# Patient Record
Sex: MALE | Race: WHITE
[De-identification: names, ages, dates, MRNs, and addresses within clinical notes are randomized per-mention and may not be internally consistent; named-entity substitution may affect disease eponyms.]

---

## 2018-04-13 ENCOUNTER — RECORDS - HEALTHEAST (OUTPATIENT)
Dept: ADMINISTRATIVE | Facility: OTHER | Age: 23
End: 2018-04-13

## 2018-05-29 ENCOUNTER — COMMUNICATION - HEALTHEAST (OUTPATIENT)
Dept: TELEHEALTH | Facility: CLINIC | Age: 23
End: 2018-05-29

## 2018-05-29 ENCOUNTER — OFFICE VISIT - HEALTHEAST (OUTPATIENT)
Dept: FAMILY MEDICINE | Facility: CLINIC | Age: 23
End: 2018-05-29

## 2018-05-29 DIAGNOSIS — F90.0 ATTENTION DEFICIT HYPERACTIVITY DISORDER (ADHD), PREDOMINANTLY INATTENTIVE TYPE: ICD-10-CM

## 2018-05-29 DIAGNOSIS — Z76.89 ENCOUNTER TO ESTABLISH CARE: ICD-10-CM

## 2018-05-29 LAB
AMPHETAMINES UR QL SCN: ABNORMAL
BARBITURATES UR QL: ABNORMAL
BENZODIAZ UR QL: ABNORMAL
CANNABINOIDS UR QL SCN: ABNORMAL
COCAINE UR QL: ABNORMAL
CREAT UR-MCNC: 168.9 MG/DL
METHADONE UR QL SCN: ABNORMAL
OPIATES UR QL SCN: ABNORMAL
OXYCODONE UR QL: ABNORMAL
PCP UR QL SCN: ABNORMAL

## 2018-05-29 ASSESSMENT — MIFFLIN-ST. JEOR: SCORE: 1823.46

## 2018-05-30 ENCOUNTER — COMMUNICATION - HEALTHEAST (OUTPATIENT)
Dept: FAMILY MEDICINE | Facility: CLINIC | Age: 23
End: 2018-05-30

## 2018-06-01 ENCOUNTER — COMMUNICATION - HEALTHEAST (OUTPATIENT)
Dept: FAMILY MEDICINE | Facility: CLINIC | Age: 23
End: 2018-06-01

## 2018-06-01 DIAGNOSIS — F90.0 ATTENTION DEFICIT HYPERACTIVITY DISORDER (ADHD), PREDOMINANTLY INATTENTIVE TYPE: ICD-10-CM

## 2018-06-04 RX ORDER — DEXTROAMPHETAMINE SACCHARATE, AMPHETAMINE ASPARTATE MONOHYDRATE, DEXTROAMPHETAMINE SULFATE AND AMPHETAMINE SULFATE 1.25; 1.25; 1.25; 1.25 MG/1; MG/1; MG/1; MG/1
5 CAPSULE, EXTENDED RELEASE ORAL EVERY MORNING
Qty: 30 CAPSULE | Refills: 0 | Status: SHIPPED | OUTPATIENT
Start: 2018-06-04

## 2021-04-21 ENCOUNTER — AMBULATORY - HEALTHEAST (OUTPATIENT)
Dept: NURSING | Facility: CLINIC | Age: 26
End: 2021-04-21

## 2021-05-12 ENCOUNTER — AMBULATORY - HEALTHEAST (OUTPATIENT)
Dept: NURSING | Facility: CLINIC | Age: 26
End: 2021-05-12

## 2021-06-01 VITALS — BODY MASS INDEX: 25.38 KG/M2 | WEIGHT: 181.3 LBS | HEIGHT: 71 IN

## 2021-06-18 NOTE — PROGRESS NOTES
Office Visit - New Patient   Arik Sparks   22 y.o.  male    Date of visit: 5/29/2018  Physician: Domonique Su CNP     Assessment and Plan   1. Encounter to establish care  2. Attention deficit hyperactivity disorder (ADHD), predominantly inattentive type  I reviewed summary of his evaluation and recommendation.  Discussed with patient possible available treatment for ADHD.  Also discussed the side effects of medication.  Did inform patient about possible addiction from the use of medication. Patient verbalized understanding of these issues, agrees with the plan and all questions were answered today. Patient was given an opportuntity to voice any other symptoms or concerns not listed above. Patient did not have any other symptoms or concerns.  - dextroamphetamine-amphetamine (ADDERALL XR) 5 MG 24 hr capsule; Take 1 capsule (5 mg total) by mouth every morning.  Dispense: 30 capsule; Refill: 0  - Drug Abuse 1+, Urine    Return in about 1 month (around 6/29/2018).     Chief Complaint   Chief Complaint   Patient presents with     Establish Care     New patient     ADHD        Patient Profile   Social History     Social History Narrative        Past Medical History   Patient Active Problem List   Diagnosis     ADD (attention deficit disorder)       Past Surgical History  He has a past surgical history that includes Tonsillectomy.     History of Present Illness   This 22 y.o. old male patient presents to the clinic to establish care and to start treatment for his ADHD. Patient reports that he was in [place] to visit his mother and told her of his difficulty but when he was in school and at his present work place. His mother suggested that he might have ADHD because she has it and two of her other brother has it as well. He was then scheduled to see a psychologist for evaluation. Radha Noriega evaluated patient for ADHD and confirmed that patient has the following criteria that qualifies him for the  "diagnosis of ADHD.  This characteristics includes has difficulty sustaining attention, struggling to follow through on instructions, has difficulty with organization, is easily distracted and is forgetful in daily activities. Patient reports that he struggle in high school and he dropped out of college after his first semester. He believes that treatment for ADHD will benefit him a lot.     Review of Systems: A 12 point comprehensive review of systems was negative except as noted.     Medications and Allergies   Current Outpatient Prescriptions   Medication Sig Dispense Refill     loratadine-pseudoephedrine (CLARITIN-D 24-HOUR)  mg per 24 hr tablet Take 1 tablet by mouth.       dextroamphetamine-amphetamine (ADDERALL XR) 5 MG 24 hr capsule Take 1 capsule (5 mg total) by mouth every morning. 30 capsule 0     No current facility-administered medications for this visit.      Allergies   Allergen Reactions     Penicillins Rash        Family and Social History   Family History   Problem Relation Age of Onset     Heart disease Maternal Grandfather         Social History   Substance Use Topics     Smoking status: Never Smoker     Smokeless tobacco: Never Used     Alcohol use Yes      Comment: occasional        Physical Exam   General Appearance:   Well groomed    /78 (Patient Site: Right Arm, Patient Position: Sitting, Cuff Size: Adult Regular)  Pulse 67  Ht 5' 11.25\" (1.81 m)  Wt 181 lb 4.8 oz (82.2 kg)  SpO2 98%  BMI 25.11 kg/m2    EYES: Eyelids, conjunctiva, and sclera were normal. Pupils were normal. Cornea, iris, and lens were normal bilaterally.  HEAD, EARS, NOSE, MOUTH, AND THROAT: Head and face were normal. Hearing was normal to voice and the ears were normal to external exam. Nose appearance was normal and there was no discharge. Oropharynx was normal.  NECK: Neck appearance was normal. There were no neck masses and the thyroid was not enlarged.  RESPIRATORY: Breathing pattern was normal and the " chest moved symmetrically.  Percussion/auscultatory percussion was normal.  Lung sounds were normal and there were no abnormal sounds.  CARDIOVASCULAR: Heart rate and rhythm were normal.      MUSCULOSKELETAL: Skeletal configuration was normal and muscle mass was normal for age.   SKIN/HAIR/NAILS: Skin color was normal.  There were no skin lesions.  Hair and nails were normal.  NEUROLOGIC: The patient was alert and oriented to person, place, time, and circumstance. Speech was normal. Cranial nerves were normal. Motor strength was normal for age. The patient was normally coordinated.  PSYCHIATRIC:  Mood and affect were normal and the patient had normal recent and remote memory. The patient's judgment and insight were normal.       Additional Information     Time: total time spent with the patient was 45 minutes of which >50% was spent in counseling and coordination of care     Domonique Su CNP  Family Nurse Practitioner  [facility]  [phone]  [email or web address]

## 2021-08-15 ENCOUNTER — HEALTH MAINTENANCE LETTER (OUTPATIENT)
Age: 26
End: 2021-08-15

## 2021-10-11 ENCOUNTER — HEALTH MAINTENANCE LETTER (OUTPATIENT)
Age: 26
End: 2021-10-11

## 2022-09-25 ENCOUNTER — HEALTH MAINTENANCE LETTER (OUTPATIENT)
Age: 27
End: 2022-09-25

## 2023-10-14 ENCOUNTER — HEALTH MAINTENANCE LETTER (OUTPATIENT)
Age: 28
End: 2023-10-14